# Patient Record
Sex: FEMALE | Race: WHITE | Employment: OTHER | URBAN - METROPOLITAN AREA
[De-identification: names, ages, dates, MRNs, and addresses within clinical notes are randomized per-mention and may not be internally consistent; named-entity substitution may affect disease eponyms.]

---

## 2019-10-30 ENCOUNTER — HOSPITAL ENCOUNTER (OUTPATIENT)
Dept: GENERAL RADIOLOGY | Age: 54
Discharge: HOME | End: 2019-10-30
Payer: COMMERCIAL

## 2019-10-30 DIAGNOSIS — M25.569 PAIN IN JOINT, LOWER LEG: ICD-10-CM

## 2019-10-30 PROCEDURE — 73565 X-RAY EXAM OF KNEES: CPT

## 2019-10-30 PROCEDURE — 73560 X-RAY EXAM OF KNEE 1 OR 2: CPT

## 2021-10-05 ENCOUNTER — TRANSCRIBE ORDER (OUTPATIENT)
Dept: SCHEDULING | Age: 56
End: 2021-10-05

## 2021-10-05 DIAGNOSIS — M77.41 METATARSALGIA OF RIGHT FOOT: Primary | ICD-10-CM

## 2021-10-06 ENCOUNTER — HOSPITAL ENCOUNTER (OUTPATIENT)
Dept: MRI IMAGING | Age: 56
Discharge: HOME | End: 2021-10-06
Attending: ORTHOPAEDIC SURGERY
Payer: COMMERCIAL

## 2021-10-06 ENCOUNTER — TRANSCRIBE ORDER (OUTPATIENT)
Dept: SCHEDULING | Age: 56
End: 2021-10-06

## 2021-10-06 DIAGNOSIS — M77.41 METATARSALGIA OF RIGHT FOOT: ICD-10-CM

## 2021-10-06 PROCEDURE — 73718 MRI LOWER EXTREMITY W/O DYE: CPT

## 2023-05-10 RX ORDER — BUPROPION HYDROCHLORIDE 150 MG/1
150 TABLET, EXTENDED RELEASE ORAL DAILY
COMMUNITY
Start: 2010-03-19

## 2023-05-10 RX ORDER — MONTELUKAST SODIUM 10 MG/1
TABLET ORAL EVERY EVENING
COMMUNITY
Start: 2010-03-19

## 2023-05-10 RX ORDER — PAROXETINE HYDROCHLORIDE 20 MG/1
TABLET, FILM COATED ORAL EVERY EVENING
COMMUNITY
Start: 2010-03-19

## 2023-10-02 ENCOUNTER — PROCEDURE VISIT (OUTPATIENT)
Age: 58
End: 2023-10-02
Payer: COMMERCIAL

## 2023-10-02 DIAGNOSIS — G57.53 TARSAL TUNNEL SYNDROME OF BOTH LOWER EXTREMITIES: Primary | ICD-10-CM

## 2023-10-02 PROCEDURE — 95911 NRV CNDJ TEST 9-10 STUDIES: CPT | Performed by: PSYCHIATRY & NEUROLOGY

## 2023-10-02 PROCEDURE — 95886 MUSC TEST DONE W/N TEST COMP: CPT | Performed by: PSYCHIATRY & NEUROLOGY

## 2023-11-18 ENCOUNTER — TELEPHONE (OUTPATIENT)
Age: 58
End: 2023-11-18

## 2023-11-18 NOTE — TELEPHONE ENCOUNTER
LM to change in person visit to virtual due to unscheduled continued maintenance at the [place] location.

## 2024-02-09 ENCOUNTER — OFFICE VISIT (OUTPATIENT)
Age: 59
End: 2024-02-09
Payer: COMMERCIAL

## 2024-02-09 VITALS
HEIGHT: 69 IN | OXYGEN SATURATION: 96 % | HEART RATE: 85 BPM | DIASTOLIC BLOOD PRESSURE: 71 MMHG | WEIGHT: 172.3 LBS | BODY MASS INDEX: 25.52 KG/M2 | SYSTOLIC BLOOD PRESSURE: 126 MMHG

## 2024-02-09 DIAGNOSIS — G47.33 OSA (OBSTRUCTIVE SLEEP APNEA): Primary | ICD-10-CM

## 2024-02-09 PROCEDURE — 99203 OFFICE O/P NEW LOW 30 MIN: CPT | Performed by: SPECIALIST

## 2024-02-09 ASSESSMENT — SLEEP AND FATIGUE QUESTIONNAIRES
HOW LIKELY ARE YOU TO NOD OFF OR FALL ASLEEP IN A CAR, WHILE STOPPED FOR A FEW MINUTES IN TRAFFIC: 0
HOW LIKELY ARE YOU TO NOD OFF OR FALL ASLEEP WHILE SITTING AND TALKING TO SOMEONE: 0
HOW LIKELY ARE YOU TO NOD OFF OR FALL ASLEEP WHILE LYING DOWN TO REST IN THE AFTERNOON WHEN CIRCUMSTANCES PERMIT: 1
HOW LIKELY ARE YOU TO NOD OFF OR FALL ASLEEP WHILE WATCHING TV: 2
HOW LIKELY ARE YOU TO NOD OFF OR FALL ASLEEP WHILE SITTING QUIETLY AFTER LUNCH WITHOUT ALCOHOL: 0
HOW LIKELY ARE YOU TO NOD OFF OR FALL ASLEEP WHILE SITTING INACTIVE IN A PUBLIC PLACE: 0
ESS TOTAL SCORE: 4
HOW LIKELY ARE YOU TO NOD OFF OR FALL ASLEEP WHEN YOU ARE A PASSENGER IN A CAR FOR AN HOUR WITHOUT A BREAK: 0
HOW LIKELY ARE YOU TO NOD OFF OR FALL ASLEEP WHILE SITTING AND READING: 1

## 2024-02-09 NOTE — PROGRESS NOTES
untreated or undertreated sleep apnea could impair judgement and the ability to function normally during the day.  Call or return if symptoms worsen or persist.          Erich Flores MD, [facility]  Electronically signed 02/09/24       This note was created using voice recognition software. Despite editing, there may be syntax errors.  This note will not be viewable in [employer].

## 2024-02-21 ENCOUNTER — PROCEDURE VISIT (OUTPATIENT)
Age: 59
End: 2024-02-21

## 2024-02-21 DIAGNOSIS — G47.33 OSA (OBSTRUCTIVE SLEEP APNEA): Primary | ICD-10-CM

## 2024-02-21 NOTE — PROGRESS NOTES
[address]  [place] [address]  Tel.  [phone]  Fax. [phone] [address]  [place] [address]  Tel.  [phone]  Fax. [phone] [address] [place] [address]  Tel.  [phone]  Fax. [phone]       S>Rebeca Menjivar is a 59 y.o. female seen today to receive a home sleep testing unit (HST).    Patient was educated on proper hookup and operation of the HST.  Instruction forms and documentation were reviewed and signed.  The patient demonstrated good understanding of the HST.    O>    There were no vitals taken for this visit.      A>  No diagnosis found.      P>  General information regarding operations and maintenance of the device was provided.  She was provided information on sleep apnea including coresponding risk factors and the importance of proper treatment.   Follow-up appointment was made to return the HST. She will be contacted once the results have been reviewed.  She was asked to contact our office for any problems regarding her home sleep test study.

## 2024-02-22 ENCOUNTER — HOSPITAL ENCOUNTER (OUTPATIENT)
Facility: HOSPITAL | Age: 59
Discharge: HOME | End: 2024-02-25
Payer: COMMERCIAL

## 2024-02-22 PROCEDURE — 95800 SLP STDY UNATTENDED: CPT | Performed by: SPECIALIST

## 2024-02-27 ENCOUNTER — TELEPHONE (OUTPATIENT)
Age: 59
End: 2024-02-27

## 2024-02-27 DIAGNOSIS — G47.33 OSA (OBSTRUCTIVE SLEEP APNEA): Primary | ICD-10-CM

## 2024-02-27 NOTE — TELEPHONE ENCOUNTER
WatchPAT HSAT performed for potential sleep disordered breathing.  8 hours 26 minutes recorded in which 7 hours 43 minutes asleep; 17.9% of sleep in REM.  All sleep stages observed.  Patient slept supine, prone and lateral.    AHI 27.3/h (4% desaturation definition).  AHI 41.2/h (3% desaturation definition).  Prominent snoring noted.  Minimal SpO2 75%.    Impression: Severe sleep disordered breathing associate with arterial desaturation.  Patient has a history of poor CPAP tolerance.    Recommendation: BiPAP titration study.    Sleep technologist: Please advise patient of HSAT results.  Order entered for BiPAP titration study.

## 2024-05-17 ENCOUNTER — HOSPITAL ENCOUNTER (OUTPATIENT)
Facility: HOSPITAL | Age: 59
Discharge: HOME | End: 2024-05-17
Attending: SPECIALIST
Payer: COMMERCIAL

## 2024-05-17 DIAGNOSIS — G47.33 OSA (OBSTRUCTIVE SLEEP APNEA): ICD-10-CM

## 2024-05-17 PROCEDURE — 95811 POLYSOM 6/>YRS CPAP 4/> PARM: CPT | Performed by: SPECIALIST

## 2024-05-18 VITALS
OXYGEN SATURATION: 98 % | SYSTOLIC BLOOD PRESSURE: 114 MMHG | DIASTOLIC BLOOD PRESSURE: 64 MMHG | HEART RATE: 80 BPM | HEIGHT: 69 IN | TEMPERATURE: 97.4 F | WEIGHT: 172 LBS | BODY MASS INDEX: 25.48 KG/M2

## 2024-06-06 ENCOUNTER — CLINICAL DOCUMENTATION (OUTPATIENT)
Age: 59
End: 2024-06-06

## 2024-06-06 NOTE — PROGRESS NOTES
BiPAP titration study performed.  HSAT demonstrated AHI 27.3/h (4% desaturation definition).  AHI 41.2/h (3% desaturation definition).  Prominent snoring noted.  Minimal SpO2 75%.  Patient has history of poor CPAP tolerance.    414.9 minutes recorded of which 17.5 minutes of sleep.  Sleep efficiency 4.2%.  Sleep limited to stage N1.    5 respiratory events occurred of which 1 hypopnea and 4 apnea (3 central, 1 obstructive).  Minimal SaO2 92%.    BiPAP initiated and maintained at 8/4 cm.    Recommendation: Other treatment options ( e.g. oral appliance, Inspire ) will be reviewed.

## 2024-09-04 ENCOUNTER — CLINICAL DOCUMENTATION (OUTPATIENT)
Age: 59
End: 2024-09-04

## 2024-09-04 ENCOUNTER — OFFICE VISIT (OUTPATIENT)
Age: 59
End: 2024-09-04
Payer: COMMERCIAL

## 2024-09-04 VITALS
HEART RATE: 75 BPM | OXYGEN SATURATION: 95 % | DIASTOLIC BLOOD PRESSURE: 82 MMHG | SYSTOLIC BLOOD PRESSURE: 131 MMHG | HEIGHT: 69 IN | WEIGHT: 172.2 LBS | BODY MASS INDEX: 25.51 KG/M2

## 2024-09-04 DIAGNOSIS — G47.33 OSA (OBSTRUCTIVE SLEEP APNEA): Primary | ICD-10-CM

## 2024-09-04 PROCEDURE — 99213 OFFICE O/P EST LOW 20 MIN: CPT | Performed by: SPECIALIST

## 2024-09-04 RX ORDER — CETIRIZINE HYDROCHLORIDE 10 MG/1
CAPSULE, LIQUID FILLED ORAL
COMMUNITY

## 2024-09-04 ASSESSMENT — SLEEP AND FATIGUE QUESTIONNAIRES
ESS TOTAL SCORE: 6
HOW LIKELY ARE YOU TO NOD OFF OR FALL ASLEEP WHILE LYING DOWN TO REST IN THE AFTERNOON WHEN CIRCUMSTANCES PERMIT: MODERATE CHANCE OF DOZING
HOW LIKELY ARE YOU TO NOD OFF OR FALL ASLEEP WHEN YOU ARE A PASSENGER IN A CAR FOR AN HOUR WITHOUT A BREAK: WOULD NEVER DOZE
HOW LIKELY ARE YOU TO NOD OFF OR FALL ASLEEP WHILE SITTING QUIETLY AFTER LUNCH WITHOUT ALCOHOL: WOULD NEVER DOZE
HOW LIKELY ARE YOU TO NOD OFF OR FALL ASLEEP WHILE SITTING AND READING: MODERATE CHANCE OF DOZING
HOW LIKELY ARE YOU TO NOD OFF OR FALL ASLEEP WHILE SITTING AND TALKING TO SOMEONE: WOULD NEVER DOZE
HOW LIKELY ARE YOU TO NOD OFF OR FALL ASLEEP IN A CAR, WHILE STOPPED FOR A FEW MINUTES IN TRAFFIC: WOULD NEVER DOZE
HOW LIKELY ARE YOU TO NOD OFF OR FALL ASLEEP WHILE SITTING INACTIVE IN A PUBLIC PLACE: WOULD NEVER DOZE
HOW LIKELY ARE YOU TO NOD OFF OR FALL ASLEEP WHILE WATCHING TV: MODERATE CHANCE OF DOZING

## 2024-09-04 NOTE — PROGRESS NOTES
[facility] - [address]  [facility]  [address] [address]  Dept: [phone]              [address]  [place] [address]  Tel.  [phone]  Fax. [phone] [address]  [place] [address]  Tel.  [phone]  Fax. [phone] [address] [address] [place] [address]  Tel.  [phone]  Fax. [phone]         Chief Complaint       Chief Complaint   Patient presents with    Sleep Problem     Results review and discuss treatment options         HPI        Rebeca Menjivar is a 59 y.o. female seen for follow-up. She had initial sleep evaluation approximately 15 years ago at which time she was diagnosed with sleep apnea and started on CPAP.  She was using a fullface mask.  She had difficulty adapting to CPAP which she discontinued.    WatchPAT HSAT performed demonstrating:AHI 27.3/h (4% desaturation definition). AHI 41.2/h (3% desaturation definition). Prominent snoring noted. Minimal SpO2 75%.     BiPAP titration performed.  Limited sleep during the study.  5 respiratory events occurred of which 1 hypopnea and 4 apnea.  Minimal SaO2 92%.      Allergies   Allergen Reactions    Seasonal        No current facility-administered medications for this visit.     She  has a past medical history of AR (allergic rhinitis), Depression, Nephrolithiasis, and Vertigo.    She  has no past surgical history on file.    She family history is not on file.        Review of Systems:  Unchanged per patient      Objective:   /82 (Site: Left Upper Arm, Position: Sitting, Cuff Size: Medium Adult)   Pulse 75   Ht 1.753 m (5' 9\")   Wt 78.1 kg (172 lb 3.2 oz)   SpO2 95%   BMI 25.43 kg/m²   Body mass index is 25.43 kg/m².     General:   Conversant, cooperative       Oropharynx:   Mallampati score II, tongue normal, narrow posterior oral airway                CVS:  Normal rate, regular rhythm        Neuro:

## 2024-12-20 ENCOUNTER — OFFICE VISIT (OUTPATIENT)
Age: 59
End: 2024-12-20
Payer: COMMERCIAL

## 2024-12-20 ENCOUNTER — CLINICAL DOCUMENTATION (OUTPATIENT)
Age: 59
End: 2024-12-20

## 2024-12-20 VITALS
SYSTOLIC BLOOD PRESSURE: 141 MMHG | WEIGHT: 173.7 LBS | HEART RATE: 80 BPM | DIASTOLIC BLOOD PRESSURE: 87 MMHG | HEIGHT: 69 IN | TEMPERATURE: 95.2 F | BODY MASS INDEX: 25.73 KG/M2 | OXYGEN SATURATION: 94 %

## 2024-12-20 DIAGNOSIS — G47.33 OSA (OBSTRUCTIVE SLEEP APNEA): Primary | ICD-10-CM

## 2024-12-20 PROCEDURE — 99213 OFFICE O/P EST LOW 20 MIN: CPT | Performed by: SPECIALIST

## 2024-12-20 ASSESSMENT — SLEEP AND FATIGUE QUESTIONNAIRES
HOW LIKELY ARE YOU TO NOD OFF OR FALL ASLEEP WHILE SITTING QUIETLY AFTER LUNCH WITHOUT ALCOHOL: WOULD NEVER DOZE
HOW LIKELY ARE YOU TO NOD OFF OR FALL ASLEEP WHILE SITTING AND READING: SLIGHT CHANCE OF DOZING
HOW LIKELY ARE YOU TO NOD OFF OR FALL ASLEEP WHEN YOU ARE A PASSENGER IN A CAR FOR AN HOUR WITHOUT A BREAK: WOULD NEVER DOZE
HOW LIKELY ARE YOU TO NOD OFF OR FALL ASLEEP WHILE SITTING INACTIVE IN A PUBLIC PLACE: WOULD NEVER DOZE
HOW LIKELY ARE YOU TO NOD OFF OR FALL ASLEEP WHILE WATCHING TV: SLIGHT CHANCE OF DOZING
HOW LIKELY ARE YOU TO NOD OFF OR FALL ASLEEP WHILE LYING DOWN TO REST IN THE AFTERNOON WHEN CIRCUMSTANCES PERMIT: SLIGHT CHANCE OF DOZING
HOW LIKELY ARE YOU TO NOD OFF OR FALL ASLEEP IN A CAR, WHILE STOPPED FOR A FEW MINUTES IN TRAFFIC: WOULD NEVER DOZE
ESS TOTAL SCORE: 3

## 2024-12-20 NOTE — PROGRESS NOTES
[facility] - [address]  [facility] SLEEP DISORDERS [facility] - [place]  [address]  [facility] [address]  Dept: [phone]              [address]  [place] [address]  Tel.  [phone]  Fax. [phone] [address]  [place] [address]  Tel.  [phone]  Fax. [phone] [address] [place] [address]  Tel.  [phone]  Fax. [phone]         Chief Complaint       Chief Complaint   Patient presents with    Sleep Problem     1st adherence         HPI        Rebeca Menjivar is a 59 y.o. female seen for follow-up. She was evaluated with a home  sleep study which demonstrated sleep disordered breathing characterized by: AHI 27.3/h (4% desaturation definition). AHI 41.2/h (3% desaturation definition). Prominent snoring noted. Minimal SpO2 75%.     Patient has history of poor CPAP tolerance.  BiPAP titration study performed.  BiPAP initiated and maintained at 8/4 cm. 5 respiratory events occurred of which 1 hypopnea and 4 apnea (3 central, 1 obstructive). Minimal SaO2 92%. 5 respiratory events occurred of which 1 hypopnea and 4 apnea (3 central, 1 obstructive). Minimal SaO2 92%.     Results were reviewed with the patient.  Treatment options discussed.  She was interested in retrying APAP.    APAP 5 to 15 cm initiated.  Device set up date: 9/13/2024.    Compliance data downloaded and reviewed in detail with the patient today. During  30 days, APAP used during 23 days with the average daily use of 5.65 hours. CMS compliance criteria 70%. AHI 3.5  per hour.     Using nasal mask.     Allergies   Allergen Reactions    Seasonal        No current facility-administered medications for this visit.     She  has a past medical history of AR (allergic rhinitis), Depression, Nephrolithiasis, and Vertigo.    She  has no past surgical history on file.    She family history is not on file.    She  reports that she has never smoked. She has never

## 2025-04-30 ENCOUNTER — TRANSCRIBE ORDERS (OUTPATIENT)
Facility: HOSPITAL | Age: 60
End: 2025-04-30

## 2025-04-30 DIAGNOSIS — M66.869 TIBIALIS ANTERIOR TENDON TEAR, NONTRAUMATIC: Primary | ICD-10-CM

## 2025-04-30 DIAGNOSIS — M76.829 TIBIALIS POSTERIOR DYSFUNCTION: ICD-10-CM

## 2025-05-08 ENCOUNTER — HOSPITAL ENCOUNTER (OUTPATIENT)
Facility: HOSPITAL | Age: 60
Discharge: HOME | End: 2025-05-11
Attending: PODIATRIST
Payer: COMMERCIAL

## 2025-05-08 DIAGNOSIS — M66.869 TIBIALIS ANTERIOR TENDON TEAR, NONTRAUMATIC: ICD-10-CM

## 2025-05-08 DIAGNOSIS — M76.829 TIBIALIS POSTERIOR DYSFUNCTION: ICD-10-CM

## 2025-05-08 PROCEDURE — 73718 MRI LOWER EXTREMITY W/O DYE: CPT
